# Patient Record
Sex: FEMALE
[De-identification: names, ages, dates, MRNs, and addresses within clinical notes are randomized per-mention and may not be internally consistent; named-entity substitution may affect disease eponyms.]

---

## 2019-03-24 ENCOUNTER — HOSPITAL ENCOUNTER (INPATIENT)
Dept: HOSPITAL 14 - H.L&D | Age: 29
LOS: 3 days | Discharge: HOME | DRG: 371 | End: 2019-03-27
Attending: OBSTETRICS & GYNECOLOGY | Admitting: OBSTETRICS & GYNECOLOGY
Payer: MEDICAID

## 2019-03-24 VITALS — BODY MASS INDEX: 32.4 KG/M2

## 2019-03-24 DIAGNOSIS — E28.2: ICD-10-CM

## 2019-03-24 DIAGNOSIS — Z3A.39: ICD-10-CM

## 2019-03-24 DIAGNOSIS — Z98.82: ICD-10-CM

## 2019-03-24 LAB
BASOPHILS # BLD AUTO: 0 K/UL (ref 0–0.2)
BASOPHILS NFR BLD: 0.3 % (ref 0–2)
EOSINOPHIL # BLD AUTO: 0.1 K/UL (ref 0–0.7)
EOSINOPHIL NFR BLD: 1.4 % (ref 0–4)
ERYTHROCYTE [DISTWIDTH] IN BLOOD BY AUTOMATED COUNT: 14.5 % (ref 11.5–14.5)
HGB BLD-MCNC: 12.3 G/DL (ref 12–16)
LYMPHOCYTES # BLD AUTO: 1.6 K/UL (ref 1–4.3)
LYMPHOCYTES NFR BLD AUTO: 18.5 % (ref 20–40)
MCH RBC QN AUTO: 28.2 PG (ref 27–31)
MCHC RBC AUTO-ENTMCNC: 33.1 G/DL (ref 33–37)
MCV RBC AUTO: 85.2 FL (ref 81–99)
MONOCYTES # BLD: 0.6 K/UL (ref 0–0.8)
MONOCYTES NFR BLD: 6.3 % (ref 0–10)
NEUTROPHILS # BLD: 6.5 K/UL (ref 1.8–7)
NEUTROPHILS NFR BLD AUTO: 73.5 % (ref 50–75)
NRBC BLD AUTO-RTO: 0.2 % (ref 0–0)
PLATELET # BLD: 223 K/UL (ref 130–400)
PMV BLD AUTO: 9.5 FL (ref 7.2–11.7)
RBC # BLD AUTO: 4.35 MIL/UL (ref 3.8–5.2)
WBC # BLD AUTO: 8.8 K/UL (ref 4.8–10.8)

## 2019-03-24 PROCEDURE — 4A1HXCZ MONITORING OF PRODUCTS OF CONCEPTION, CARDIAC RATE, EXTERNAL APPROACH: ICD-10-PCS | Performed by: OBSTETRICS & GYNECOLOGY

## 2019-03-24 RX ADMIN — SIMETHICONE CHEW TAB 80 MG SCH MG: 80 TABLET ORAL at 22:06

## 2019-03-24 NOTE — OBADHP
===========================

Datetime: 2019 08:20

===========================

   

IP Chief Complaint Other:  Breech presentation

Admit Comment, IP Provider:  [id number] IUP at 39+w breech presentatoin....schedueld for C/S.  +FM; no 
CTX; no VB; no SROM

      

   PMH: PCOS; migraines

   PSH: breast implants; sinus; tonisllectomy

   NKA

   POBGYNH: no STD; 

      

   A: IUP at 39W

   breech presentatoin

   PLAN: Admit for C/S

   Condition, procedure (C/S) with its riks/complications explained in detail.   Her questions answer
ed.  Informed consent obtianed after her full understanding.  MAHNDO

      

Pelvic Type - PN:  Adequate

Extremities - PN:  Normal

Abdomen - PN:  Normal

Back - PN:  Normal

Breast - PN:  Not Done

Lungs - PN:  Normal

Heart - PN:  Normal

Thyroid - PN:  Normal

Neurologic - PN:  Normal

HEENT - PN:  Normal

General - PN:  Normal

Fetal Presentation-Admit:  Breech

IP Fetus A Comments:  Sonogram breech

FHR - Baseline A Provider:  140

Pool Provider:  Negative

IP Prenatal Hx Assessment:  The Prenatal History has been Reviewed and is Current

Vital Signs Provider:  Reviewed

NICHD Variability Prov Fetus A:  Moderate 6-25bpm

NICHD Accel Fetus A IP Provider:  [id number]

FHR Category Provider Fetus A:  Category I

NICHD Decel Fetus A IP Provider:  None

Genitourinary Exam:  Normal

DTRs - PN:  Normal

IP Adm Impression:  Term, intrauterine pregnancy; No Active Labor; Intact Membranes

IP Admit Plan:  Admit to unit; Initiate  Section protocol

## 2019-03-25 LAB
ERYTHROCYTE [DISTWIDTH] IN BLOOD BY AUTOMATED COUNT: 14.8 % (ref 11.5–14.5)
HGB BLD-MCNC: 10.6 G/DL (ref 12–16)
MCH RBC QN AUTO: 28.5 PG (ref 27–31)
MCHC RBC AUTO-ENTMCNC: 33.6 G/DL (ref 33–37)
MCV RBC AUTO: 84.8 FL (ref 81–99)
PLATELET # BLD: 194 K/UL (ref 130–400)
RBC # BLD AUTO: 3.73 MIL/UL (ref 3.8–5.2)
WBC # BLD AUTO: 10.7 K/UL (ref 4.8–10.8)

## 2019-03-25 RX ADMIN — SIMETHICONE CHEW TAB 80 MG SCH MG: 80 TABLET ORAL at 05:00

## 2019-03-25 RX ADMIN — SIMETHICONE CHEW TAB 80 MG SCH MG: 80 TABLET ORAL at 17:28

## 2019-03-25 RX ADMIN — OXYCODONE HYDROCHLORIDE AND ACETAMINOPHEN PRN TAB: 5; 325 TABLET ORAL at 05:26

## 2019-03-25 RX ADMIN — MULTIPLE VITAMINS W/ MINERALS TAB SCH TAB: TAB at 08:39

## 2019-03-25 RX ADMIN — SIMETHICONE CHEW TAB 80 MG SCH MG: 80 TABLET ORAL at 21:33

## 2019-03-25 RX ADMIN — SIMETHICONE CHEW TAB 80 MG SCH MG: 80 TABLET ORAL at 09:16

## 2019-03-25 NOTE — OBPPN
===========================

Datetime: 03/25/2019 10:40

===========================

   

PP Pain Prov:  Within normal limits

PP Nausea Prov:  Denies

PP Flatus Prov:  Yes

PP BM Prov:  No

PP Breasts Prov:  Normal

PP Heart Prov:  Normal

PP Lungs Prov:  Normal

PP Abdomen/Uterus Prov:  Normal

PP Lochia Prov:  Normal

PP Vulva/Perineum Prov:  Normal

PP CVA Tenderness Prov:  Normal

PP Extremities Prov:  Normal

PP Comments Phys Exam Prov:  Fundus firm under umbilicus

PP Impression Prov:  Normal postpartum progression

PP Plan Prov:  Continue present management

PP Progress Note Prov:  Patient denies CP, no SOB, no N/V, tolerating PO diet, ambulating/voiding wel
l, mild lochia, +flatus, no BM, abdominal pain tolerable with meds

      

   A/P POD #1

   1. Continue post op orders

   2. Motrin/Percocet prn pain

   3. Encourage ambulation/breastfeeding

IP PP Procedures:  None

Vital Signs Provider PP:  Reviewed; Within Normal Limits

## 2019-03-26 RX ADMIN — SIMETHICONE CHEW TAB 80 MG SCH MG: 80 TABLET ORAL at 16:12

## 2019-03-26 RX ADMIN — SIMETHICONE CHEW TAB 80 MG SCH MG: 80 TABLET ORAL at 09:19

## 2019-03-26 RX ADMIN — SIMETHICONE CHEW TAB 80 MG SCH MG: 80 TABLET ORAL at 22:54

## 2019-03-26 RX ADMIN — SIMETHICONE CHEW TAB 80 MG SCH MG: 80 TABLET ORAL at 03:37

## 2019-03-26 RX ADMIN — OXYCODONE HYDROCHLORIDE AND ACETAMINOPHEN PRN TAB: 5; 325 TABLET ORAL at 02:37

## 2019-03-26 RX ADMIN — MULTIPLE VITAMINS W/ MINERALS TAB SCH TAB: TAB at 08:10

## 2019-03-26 NOTE — OBPPN
===========================

Datetime: 2019 08:45

===========================

   

PP Pain Prov:  Within normal limits

PP Nausea Prov:  Denies

PP Flatus Prov:  Yes

PP BM Prov:  No

PP Breasts Prov:  Normal

PP Heart Prov:  Normal

PP Lungs Prov:  Normal

PP Abdomen/Uterus Prov:  Normal

PP Lochia Prov:  Normal

PP Vulva/Perineum Prov:  Normal

PP CVA Tenderness Prov:  Normal

PP Extremities Prov:  Normal

PP Impression Prov:  Normal postpartum progression

PP Plan Prov:  Continue present management

PP Progress Note Prov:  She is waling; feels fine; no BM

      

   A: S/P  day 2

   PLAN: cont post op care

Vital Signs Provider PP:  Reviewed; Within Normal Limits

   

===========================

Datetime: 2019 10:40

===========================

   

PP Comments Phys Exam Prov:  Fundus firm under umbilicus

   Incision clean/dry/intact

## 2019-03-27 VITALS
HEART RATE: 83 BPM | OXYGEN SATURATION: 97 % | TEMPERATURE: 98.2 F | SYSTOLIC BLOOD PRESSURE: 106 MMHG | DIASTOLIC BLOOD PRESSURE: 71 MMHG | RESPIRATION RATE: 20 BRPM

## 2019-03-27 RX ADMIN — SIMETHICONE CHEW TAB 80 MG SCH: 80 TABLET ORAL at 03:50

## 2019-03-27 RX ADMIN — MULTIPLE VITAMINS W/ MINERALS TAB SCH TAB: TAB at 08:41

## 2019-03-27 RX ADMIN — SIMETHICONE CHEW TAB 80 MG SCH MG: 80 TABLET ORAL at 08:40

## 2019-03-27 RX ADMIN — OXYCODONE HYDROCHLORIDE AND ACETAMINOPHEN PRN TAB: 5; 325 TABLET ORAL at 08:41

## 2019-03-27 NOTE — OBDCSUM
===========================

Datetime: 03/27/2019 09:20

===========================

   

Discharged to, Provider:  Home

Follow up at, Provider:  [facility]

Discharge Diagnosis, Provider:  Term Pregnancy Delivered

Follow up in weeks, Provider:  1 week

Disch Activity Restrictions:  Nothing in vagina - [place], tampons, douche

Discharge Comment, Provider:  Patient cleared for discharge

Contraception after Delivery:  Undecided

## 2019-03-27 NOTE — OBPPN
===========================

Datetime: 03/27/2019 09:14

===========================

   

PP Pain Prov:  Within normal limits

PP Nausea Prov:  Denies

PP Flatus Prov:  Yes

PP Breasts Prov:  Not Done

PP Heart Prov:  Normal

PP Lungs Prov:  Normal

PP Abdomen/Uterus Prov:  Normal

PP Lochia Prov:  Not Done

PP Vulva/Perineum Prov:  Not Done

PP CVA Tenderness Prov:  Normal

PP Extremities Prov:  Normal

PP C/S Incision Prov:  Normal

PP Impression Prov:  Normal postpartum progression

PP Plan Prov:  Discharge

PP Progress Note Prov:  Patient doing well ambulating tolerating diet

   Vital signs stable afebrile

   Physical exam uterus firm below the umbilicus

   Incision clean dry intact

   Postoperative day #3

   Patient cleared for discharge

   No heavy lifting

   Nothing per vagina

   Prescription provided

Vital Signs Provider PP:  Reviewed

## 2019-03-28 NOTE — OP
PROCEDURE DATE:  2019



PREOPERATIVE DIAGNOSIS:  Intrauterine pregnancy at 39 weeks' gestation,

breech presentation.



POSTOPERATIVE DIAGNOSIS:  Intrauterine pregnancy at 39 weeks' gestation,

breech presentation.



PROCEDURE:  Primary low-transverse  section via Pfannenstiel

incision.



SURGEON:  Sami Crowe DO



ASSISTANT:  Phil Gayle MD.  Dr. Phil Gayle is a board certified OB/GYN

physician, who was available for this surgical case.  His presence was

vital and necessary for the procedure.  He was present from the time of

skin incision to the delivery of the infant to the closure of the skin

(there were no other surgical assistant available).



ANESTHESIOLOGIST:  Praveen Henao MD



ANESTHESIA:  Spinal.



OPERATIVE FINDINGS:  A live female infant, delivered from the marylou breech

presentation.  Infant was crying spontaneously.  Clear amniotic fluid

noted.  Placenta was delivered intact manually.  Ovaries and tubes appeared

to be within normal limits.  She remained hemodynamically stable throughout

the procedure.



ESTIMATED BLOOD LOSS:  800 mL.



POSTOPERATIVE CONDITION:  She was transferred to the recovery room in

stable condition.



DESCRIPTION OF PROCEDURE:  The patient was brought to the operating room. 

She had spinal anesthesia by Dr. Henao and was placed in a supine position. 

Compression boots were placed on both lower extremities.  The catheter was

draining clear urine and left in place.  She was draped and prepped in

usual sterile manner.  Once checked and adequate anesthesia was obtained, a

Pfannenstiel incision was made using a scalpel.  The incision was then

taken down to the underlying fascia using electrocautery.  Fascia was

nicked at the midline and extended bilaterally using electrocautery. 

Inferior aspect of the fascia was grasped using two Kocher clamps, tented

up, and the rectus muscle was both bluntly and sharply dissected.  The same

was done with the superior aspect of the fascia.  In the midline

superiorly, the rectus muscle was  bluntly.  Peritoneum was

identified and entered bluntly.  The incision was then extended superiorly

and inferiorly with direct visualization of the bladder and intestines. 

Bladder blade was then inserted.  Two wet lap pads were placed on the

paracolic gutters.  Bladder flap was created by incising peritoneum on the

uterus and extending it bilaterally using Metzenbaum scissors.  Bladder

flap was created digitally.  Bladder blade was then inserted behind the

bladder flap.  A low transverse incision was made using a scalpel.  Upon

entering the uterus, the incision was then extended bilaterally using

bandage scissors.  Rupture of membranes was performed using forceps with

teeth, presenting part was the infant's buttocks.  This was carefully

brought through the uterine and skin incision.  Both legs were delivered as

atraumatically as possible.  The infant was also delivered from marylou

presentation as atraumatically as possible.  Once the infant's head was

delivered, the infant was crying spontaneously.  The cord was clamped and

cut.  Infant was handed to the pediatrician in attendance.  Cord bloods

were obtained.  Placenta was delivered intact manually.  Uterus was then

exteriorized and cleared of debris and clots.  Good contracture of the

uterus was noted.  Ovaries and tubes appeared to be within normal limits. 

First layer of the uterus was closed using 0 Vicryl suture, second layer of

the uterus was closed using 0 Vicryl suture, both layers in interlocking

fashion, second layer imbricating the first layer.  Good hemostasis was

assured.  Posterior cul-de-sac was noted to be clear of debris and clots. 

Copious irrigation was performed.  The uterus was placed back into the

peritoneal cavity.  All equipments were removed and accounted for. 

Incision line on the uterus was reinspected and noted to have good

hemostasis.  A 0 Vicryl suture was used to approximate the peritoneum in a

running fashion.  Rectus muscle was noted to have good hemostasis and was

approximated x3 using 0 Vicryl suture.  A 0 Vicryl suture was used to close

the fascial layer in a running fashion.  Irrigation was performed. 

Subcuticular layer was noted to have good hemostasis, assured using

electrocautery.  A 2-0 plain suture was used to approximate the

subcuticular layer x3.  A 3-0 Vicryl suture was used to approximate the

skin.  Dermabond, Steri-Strips, and pressure bandages were applied.  She

tolerated the procedure well and was transferred to the recovery room in

stable condition.







__________________________________________

Sami Crowe DO





DD:  2019 12:32:[id number]

DT:  2019 13:15:39

Job # [id number]

TIGIST